# Patient Record
Sex: FEMALE | Race: WHITE | NOT HISPANIC OR LATINO | Employment: FULL TIME | URBAN - METROPOLITAN AREA
[De-identification: names, ages, dates, MRNs, and addresses within clinical notes are randomized per-mention and may not be internally consistent; named-entity substitution may affect disease eponyms.]

---

## 2023-04-20 ENCOUNTER — OFFICE VISIT (OUTPATIENT)
Dept: PRIMARY CARE | Facility: CLINIC | Age: 43
End: 2023-04-20
Payer: COMMERCIAL

## 2023-04-20 VITALS
BODY MASS INDEX: 19.45 KG/M2 | OXYGEN SATURATION: 98 % | HEIGHT: 61 IN | WEIGHT: 103 LBS | DIASTOLIC BLOOD PRESSURE: 98 MMHG | SYSTOLIC BLOOD PRESSURE: 150 MMHG | TEMPERATURE: 98.4 F | HEART RATE: 91 BPM

## 2023-04-20 DIAGNOSIS — L73.9 FOLLICULITIS: ICD-10-CM

## 2023-04-20 DIAGNOSIS — R31.21 ASYMPTOMATIC MICROSCOPIC HEMATURIA: Primary | ICD-10-CM

## 2023-04-20 PROBLEM — R35.0 URINARY FREQUENCY: Status: ACTIVE | Noted: 2023-04-20

## 2023-04-20 PROBLEM — N76.0 ACUTE VAGINITIS: Status: ACTIVE | Noted: 2023-04-20

## 2023-04-20 PROBLEM — O14.90 PRE-ECLAMPSIA (HHS-HCC): Status: ACTIVE | Noted: 2023-04-20

## 2023-04-20 PROBLEM — G90.A POTS (POSTURAL ORTHOSTATIC TACHYCARDIA SYNDROME): Status: ACTIVE | Noted: 2023-04-20

## 2023-04-20 PROBLEM — B37.31 VAGINAL YEAST INFECTION: Status: ACTIVE | Noted: 2023-04-20

## 2023-04-20 PROBLEM — R30.0 DYSURIA: Status: ACTIVE | Noted: 2023-04-20

## 2023-04-20 PROBLEM — N20.0 CALCULUS OF KIDNEY: Status: ACTIVE | Noted: 2023-04-20

## 2023-04-20 PROBLEM — N39.0 ACUTE LOWER UTI: Status: ACTIVE | Noted: 2023-04-20

## 2023-04-20 PROBLEM — F41.9 ANXIETY: Status: ACTIVE | Noted: 2023-04-20

## 2023-04-20 PROBLEM — E03.9 HYPOTHYROIDISM: Status: ACTIVE | Noted: 2023-04-20

## 2023-04-20 PROBLEM — E55.9 VITAMIN D DEFICIENCY: Status: ACTIVE | Noted: 2023-04-20

## 2023-04-20 PROBLEM — R00.2 PALPITATIONS: Status: ACTIVE | Noted: 2023-04-20

## 2023-04-20 PROBLEM — G51.0 BELL'S PALSY: Status: ACTIVE | Noted: 2023-04-20

## 2023-04-20 LAB
POC APPEARANCE, URINE: CLEAR
POC BILIRUBIN, URINE: NEGATIVE
POC BLOOD, URINE: ABNORMAL
POC COLOR, URINE: ABNORMAL
POC GLUCOSE, URINE: NEGATIVE MG/DL
POC KETONES, URINE: NEGATIVE MG/DL
POC LEUKOCYTES, URINE: NEGATIVE
POC NITRITE,URINE: NEGATIVE
POC PH, URINE: 7 PH
POC PROTEIN, URINE: NEGATIVE MG/DL
POC SPECIFIC GRAVITY, URINE: 1.01
POC UROBILINOGEN, URINE: 0.2 EU/DL

## 2023-04-20 PROCEDURE — 3077F SYST BP >= 140 MM HG: CPT | Performed by: FAMILY MEDICINE

## 2023-04-20 PROCEDURE — 99213 OFFICE O/P EST LOW 20 MIN: CPT | Performed by: FAMILY MEDICINE

## 2023-04-20 PROCEDURE — 1036F TOBACCO NON-USER: CPT | Performed by: FAMILY MEDICINE

## 2023-04-20 PROCEDURE — 3080F DIAST BP >= 90 MM HG: CPT | Performed by: FAMILY MEDICINE

## 2023-04-20 PROCEDURE — 81003 URINALYSIS AUTO W/O SCOPE: CPT | Performed by: FAMILY MEDICINE

## 2023-04-20 RX ORDER — LEVOTHYROXINE SODIUM 50 UG/1
50 TABLET ORAL DAILY
COMMUNITY
End: 2023-09-11

## 2023-04-20 RX ORDER — METOPROLOL SUCCINATE 25 MG/1
TABLET, EXTENDED RELEASE ORAL
COMMUNITY
Start: 2023-04-08 | End: 2023-07-05

## 2023-04-20 RX ORDER — HYDROQUINONE 40 MG/G
CREAM TOPICAL
COMMUNITY
Start: 2022-12-20 | End: 2023-09-11

## 2023-04-20 RX ORDER — CLOBETASOL PROPIONATE 0.46 MG/ML
SOLUTION TOPICAL
COMMUNITY
Start: 2019-03-08 | End: 2023-09-11

## 2023-04-20 RX ORDER — CHOLECALCIFEROL (VITAMIN D3) 125 MCG
CAPSULE ORAL
COMMUNITY

## 2023-04-20 RX ORDER — FLUTICASONE PROPIONATE 50 MCG
SPRAY, SUSPENSION (ML) NASAL
COMMUNITY

## 2023-04-20 ASSESSMENT — ENCOUNTER SYMPTOMS
HEMATURIA: 0
FREQUENCY: 0

## 2023-04-20 NOTE — PATIENT INSTRUCTIONS
We will send urine for urinalysis  Trace microscopic blood does not look concerning  We will resend for culture    Folliculitis: Use topical antibiotics, reduce shaving  Follow-up with signs of cysts or worsening infection

## 2023-04-20 NOTE — PROGRESS NOTES
"Subjective   Patient ID: Linnea Alcaraz is a 42 y.o. female who presents for UTI and Urgent Care follow up. States she was treated with Macrobid; completed this on 4/14/23; still experiencing a stinging sensation after urinating. States there was quite a bit of blood in her urine; has a h/o kidney stones. Lastly, pt had a copper IUD placed in February; not sure if this has changed her body chemistry and made her more susceptible to infections or if she does in fact have a kidney stone.      Recent UTI  was having blood and pain ,  resolved   Last day of Abx was 4/14   Does have a history of kidney stones, was concerned about the amount of blood that she had but it is gone now  Did have an IUD in Feb   Period last week , ended yesterday   No back pain     Was having some folliculitis after shaving in the suprapubic area was using some Neosporin on this         Review of Systems   Genitourinary:  Negative for frequency, hematuria and vaginal discharge.       Objective   BP (!) 150/98   Pulse 91   Temp 36.9 °C (98.4 °F)   Ht 1.549 m (5' 1\")   Wt 46.7 kg (103 lb)   SpO2 98%   BMI 19.46 kg/m²     Physical Exam    Patient is alert and oriented, nontoxic  No CVAT  Abdomen soft nontender nondistended  Few erythematous irritated follicles in the suprapubic region, no abscesses    Assessment/Plan   Problem List Items Addressed This Visit       Asymptomatic microscopic hematuria - Primary    Relevant Orders    POCT UA Automated manually resulted    Urinalysis Microscopic Only    Urine Culture     Other Visit Diagnoses       Folliculitis                   "

## 2023-04-22 ENCOUNTER — TELEPHONE (OUTPATIENT)
Dept: PRIMARY CARE | Facility: CLINIC | Age: 43
End: 2023-04-22

## 2023-04-22 DIAGNOSIS — R31.21 ASYMPTOMATIC MICROSCOPIC HEMATURIA: Primary | ICD-10-CM

## 2023-04-22 NOTE — TELEPHONE ENCOUNTER
PT calling she states her urine was sent for culture Thurs and she hasn't received any results yet. She is leaving for [place] on Monday for work and needs to start the antibiotic if she needs one before she goes. Can you find out where the results may be?

## 2023-04-23 RX ORDER — CIPROFLOXACIN 500 MG/1
500 TABLET ORAL 2 TIMES DAILY
Qty: 10 TABLET | Refills: 0 | Status: SHIPPED | OUTPATIENT
Start: 2023-04-23 | End: 2023-04-28

## 2023-04-23 NOTE — TELEPHONE ENCOUNTER
Spoke with patient via Answering Service. Reports persistent symptoms. No new symptoms. Reports starting treatment for yeast Infection per gynecology on Friday. Urine culture results not yet available. Requests an anabiotic to have on hand during her school trip to [facility], pending urine culture and susceptibility report. Sent in Cipro 500 mg b.i.d., in case urine culture comes back positive and susceptible to Cipro.

## 2023-04-25 NOTE — TELEPHONE ENCOUNTER
----- Message from Malinda Nielson MD sent at 4/25/2023  1:00 PM EDT -----  Can you call the patient, let her know that the urine was not sent for culture, she can take the antibiotic as directed, if she continues to have symptoms we will need to check another sample  ----- Message -----  From: Gerri Dietz LPN  Sent: 4/25/2023  11:29 AM EDT  To: Malinda Nielson MD    I called the lab they do not have any culture running and no urine in lab  ----- Message -----  From: Malinda Nielson MD  Sent: 4/24/2023   3:05 PM EDT  To: Gerri Dietz LPN    Can you check with the lab, I am not sure if this was actually sent for urine culture, the orders in there but it does not look like it is active  ----- Message -----  From: Mayito Mercado DO  Sent: 4/24/2023   2:35 PM EDT  To: Malinda Nielson MD    Spoke with patient yesterday.  Sent in Cipro for her to have on hand during her trip to [place].  Urine culture results still did not appear to be available.

## 2023-07-04 DIAGNOSIS — R00.2 PALPITATIONS: Primary | ICD-10-CM

## 2023-07-05 RX ORDER — METOPROLOL SUCCINATE 25 MG/1
TABLET, EXTENDED RELEASE ORAL
Qty: 90 TABLET | Refills: 1 | Status: SHIPPED | OUTPATIENT
Start: 2023-07-05 | End: 2024-01-22

## 2023-07-05 RX ORDER — NORETHINDRONE ACETATE AND ETHINYL ESTRADIOL 1; 20 MG/1; UG/1
1 TABLET ORAL DAILY
COMMUNITY
Start: 2023-04-12 | End: 2023-09-11

## 2023-09-09 DIAGNOSIS — E03.9 HYPOTHYROIDISM, UNSPECIFIED: ICD-10-CM

## 2023-09-11 ENCOUNTER — OFFICE VISIT (OUTPATIENT)
Dept: PRIMARY CARE | Facility: CLINIC | Age: 43
End: 2023-09-11
Payer: COMMERCIAL

## 2023-09-11 ENCOUNTER — LAB (OUTPATIENT)
Dept: LAB | Facility: LAB | Age: 43
End: 2023-09-11
Payer: COMMERCIAL

## 2023-09-11 VITALS
HEART RATE: 106 BPM | TEMPERATURE: 98.7 F | OXYGEN SATURATION: 98 % | HEIGHT: 60 IN | WEIGHT: 102 LBS | BODY MASS INDEX: 20.03 KG/M2

## 2023-09-11 DIAGNOSIS — Z20.2 STD EXPOSURE: Primary | ICD-10-CM

## 2023-09-11 DIAGNOSIS — R82.90 ABNORMAL URINE: ICD-10-CM

## 2023-09-11 DIAGNOSIS — Z20.2 STD EXPOSURE: ICD-10-CM

## 2023-09-11 LAB
POC APPEARANCE, URINE: CLEAR
POC BILIRUBIN, URINE: NEGATIVE
POC BLOOD, URINE: ABNORMAL
POC COLOR, URINE: YELLOW
POC GLUCOSE, URINE: NEGATIVE MG/DL
POC KETONES, URINE: NEGATIVE MG/DL
POC LEUKOCYTES, URINE: ABNORMAL
POC NITRITE,URINE: NEGATIVE
POC PH, URINE: 5 PH
POC PROTEIN, URINE: NEGATIVE MG/DL
POC SPECIFIC GRAVITY, URINE: 1.01
POC UROBILINOGEN, URINE: 0.2 EU/DL

## 2023-09-11 PROCEDURE — 36415 COLL VENOUS BLD VENIPUNCTURE: CPT

## 2023-09-11 PROCEDURE — 99213 OFFICE O/P EST LOW 20 MIN: CPT | Performed by: FAMILY MEDICINE

## 2023-09-11 PROCEDURE — 80074 ACUTE HEPATITIS PANEL: CPT

## 2023-09-11 PROCEDURE — 86780 TREPONEMA PALLIDUM: CPT

## 2023-09-11 PROCEDURE — 1036F TOBACCO NON-USER: CPT | Performed by: FAMILY MEDICINE

## 2023-09-11 PROCEDURE — 87591 N.GONORRHOEAE DNA AMP PROB: CPT

## 2023-09-11 PROCEDURE — 87661 TRICHOMONAS VAGINALIS AMPLIF: CPT

## 2023-09-11 PROCEDURE — 81003 URINALYSIS AUTO W/O SCOPE: CPT | Performed by: FAMILY MEDICINE

## 2023-09-11 PROCEDURE — 87389 HIV-1 AG W/HIV-1&-2 AB AG IA: CPT

## 2023-09-11 PROCEDURE — 87491 CHLMYD TRACH DNA AMP PROBE: CPT

## 2023-09-11 RX ORDER — LORATADINE 10 MG
10 TABLET,DISINTEGRATING ORAL DAILY
COMMUNITY

## 2023-09-11 RX ORDER — LEVOTHYROXINE SODIUM 50 UG/1
50 TABLET ORAL DAILY
Qty: 90 TABLET | Refills: 3 | Status: SHIPPED | OUTPATIENT
Start: 2023-09-11

## 2023-09-11 NOTE — PATIENT INSTRUCTIONS
Get your blood work as ordered.  You should hear from our office with results whether they are normal are not within a few days.  Please call the office if you do not hear from us.     Cultures for vaginal infection, GC chlamydia and trichomonas

## 2023-09-11 NOTE — PROGRESS NOTES
Subjective   Patient ID: Linnea Alcaraz is a 43 y.o. female who presents for concerns with std exposure.    Patient got   Has a recent sexual partner, is concerned about infidelity and possible STD exposure  Patient is asymptomatic  No recent vaginal discharge or itching   No sores or skin lesions     Had a recent vaginal infection but cleared  Just finished.  No urinary symptoms         Review of Systems    Objective   There were no vitals taken for this visit.    Physical Exam    Assessment/Plan   Problem List Items Addressed This Visit    None  Visit Diagnoses       STD exposure    -  Primary    Relevant Orders    C. Trachomatis / N. Gonorrhoeae, Amplified Detection    Hepatitis Panel, Acute    HIV 1/2 Antigen/Antibody Screen with Reflex to Confirmation    Syphilis Screen with Reflex    Trichomonas vaginalis, Amplified    Abnormal urine        Relevant Orders    C. Trachomatis / N. Gonorrhoeae, Amplified Detection    Hepatitis Panel, Acute    HIV 1/2 Antigen/Antibody Screen with Reflex to Confirmation    Syphilis Screen with Reflex    Trichomonas vaginalis, Amplified    POCT UA Automated manually resulted (Completed)

## 2023-09-12 LAB
CHLAMYDIA TRACH., AMPLIFIED: NEGATIVE
HEPATITIS A VIRUS IGM AB PRESENCE IN SER/PLAS BY IMMUNOASSAY: NONREACTIVE
HEPATITIS B VIRUS CORE IGM AB PRESENCE IN SER/PLAS BY IMMUNOASSY: NONREACTIVE
HEPATITIS B VIRUS SURFACE AG PRESENCE IN SERUM: NONREACTIVE
HEPATITIS C VIRUS AB PRESENCE IN SERUM: NONREACTIVE
HIV 1/ 2 AG/AB SCREEN: NONREACTIVE
N. GONORRHEA, AMPLIFIED: NEGATIVE
SYPHILIS TOTAL AB: NONREACTIVE
TRICHOMONAS VAGINALIS: NEGATIVE

## 2023-09-14 ENCOUNTER — TELEPHONE (OUTPATIENT)
Dept: PRIMARY CARE | Facility: CLINIC | Age: 43
End: 2023-09-14

## 2023-09-14 NOTE — TELEPHONE ENCOUNTER
Result Communication    Resulted Orders   C. Trachomatis / N. Gonorrhoeae, Amplified Detection   Result Value Ref Range    Neisseria gonorrhea,Amplified NEGATIVE Negative      Comment:       The APTIMA Combo 2 assay is FDA-approved for Chlamydia    trachomatis and Neisseria gonorrhoeae testing on female    endocervical and vaginal swabs, ThinPrep liquid pap    samples, male urine samples and urethral swabs.    Performance characteristics for Chlamydia trachomatis and    Neisseria gonorrhoeae testing on specific non-FDA-approved    sample types (female urine samples) have been validated by    [facility]. This    laboratory is certified by CLIA to perform high complexity    testing. Samples from all other sites are not validated    for this method.    Chlamydia trachomatis, Amplified NEGATIVE Negative      Comment:       The APTIMA Combo 2 assay is FDA-approved for Chlamydia    trachomatis and Neisseria gonorrhoeae testing on female    endocervical and vaginal swabs, ThinPrep liquid pap    samples, male urine samples and urethral swabs.    Performance characteristics for Chlamydia trachomatis and    Neisseria gonorrhoeae testing on specific non-FDA-approved    sample types (female urine samples) have been validated by    [facility]. This    laboratory is certified by CLIA to perform high complexity    testing. Samples from all other sites are not validated    for this method.   Trichomonas vaginalis, Amplified   Result Value Ref Range    Trichomonas Vaginalis NEGATIVE Negative      Comment:       The APTIMA Trichomonas vaginalis assay is FDA-approved for    testing on female endocervical swabs, vaginal swabs, and    ThinPrep liquid pap samples. Performance characteristics    for Trichomonas vaginalis on specific non-FDA-approved    sample types (female and male urine and male urethral    swabs) have been validated by [facility]  [facility]. This laboratory is certified by    CLIA to perform high complexity testing. Samples from all    other sites are not validated for this method.  Performance characteristics for Trichomonas Vaginalis testing on urine   samples has been validated by [facility].  Testing on   this sample type is not FDA-approved, but such approval is not necessary.   This laboratory is certified by CLIA to perform high complexity testing.   POCT UA Automated manually resulted   Result Value Ref Range    POC Color, Urine Yellow Straw, Yellow, Light Yellow    POC Appearance, Urine Clear Clear    POC Specific Gravity, Urine 1.010 1.005 - 1.035    POC PH, Urine 5.0 No Reference Range Established PH    POC Protein, Urine NEGATIVE NEGATIVE, 30 (1+) mg/dl    POC Glucose, Urine NEGATIVE NEGATIVE mg/dl    POC Blood, Urine MODERATE (2+) (A) NEGATIVE    POC Ketones, Urine NEGATIVE NEGATIVE mg/dl    POC Bilirubin, Urine NEGATIVE NEGATIVE    POC Urobilinogen, Urine 0.2 0.2, 1.0 EU/DL    Poc Nitrate, Urine NEGATIVE NEGATIVE    POC Leukocytes, Urine TRACE (A) NEGATIVE       4:56 PM    Malinda Nielson MD  [address] [place] [address] Clinical Support Staff  TD testing is all negative: Negative for HIV, hepatitis syphilis, GC, chlamydia, trichomonas    LVM. CA

## 2023-09-18 ENCOUNTER — TELEPHONE (OUTPATIENT)
Dept: PRIMARY CARE | Facility: CLINIC | Age: 43
End: 2023-09-18

## 2023-09-20 ENCOUNTER — TELEPHONE (OUTPATIENT)
Dept: PRIMARY CARE | Facility: CLINIC | Age: 43
End: 2023-09-20

## 2023-09-22 DIAGNOSIS — Z20.2 STD EXPOSURE: ICD-10-CM

## 2023-09-23 ENCOUNTER — LAB (OUTPATIENT)
Dept: LAB | Facility: LAB | Age: 43
End: 2023-09-23
Payer: COMMERCIAL

## 2023-09-23 DIAGNOSIS — Z20.2 STD EXPOSURE: ICD-10-CM

## 2023-09-23 PROCEDURE — 87389 HIV-1 AG W/HIV-1&-2 AB AG IA: CPT

## 2023-09-23 PROCEDURE — 36415 COLL VENOUS BLD VENIPUNCTURE: CPT

## 2023-09-24 LAB — HIV 1/ 2 AG/AB SCREEN: NONREACTIVE

## 2023-10-23 PROBLEM — R25.8 SYNKINESIS: Status: ACTIVE | Noted: 2023-05-23

## 2023-10-23 RX ORDER — CLINDAMYCIN PHOSPHATE 10 UG/ML
LOTION TOPICAL
COMMUNITY
Start: 2023-10-03

## 2023-10-23 RX ORDER — HYDROCORTISONE 25 MG/G
CREAM TOPICAL
COMMUNITY
Start: 2023-10-03

## 2023-10-23 RX ORDER — MULTIVIT-MIN/FERROUS FUMARATE 9 MG/15 ML
LIQUID (ML) ORAL
COMMUNITY
Start: 2020-09-10

## 2023-10-24 ENCOUNTER — OFFICE VISIT (OUTPATIENT)
Dept: PRIMARY CARE | Facility: CLINIC | Age: 43
End: 2023-10-24
Payer: COMMERCIAL

## 2023-10-24 VITALS
WEIGHT: 107 LBS | HEART RATE: 101 BPM | OXYGEN SATURATION: 99 % | BODY MASS INDEX: 20.2 KG/M2 | DIASTOLIC BLOOD PRESSURE: 93 MMHG | HEIGHT: 61 IN | SYSTOLIC BLOOD PRESSURE: 144 MMHG | TEMPERATURE: 98.4 F

## 2023-10-24 DIAGNOSIS — E03.9 ACQUIRED HYPOTHYROIDISM: ICD-10-CM

## 2023-10-24 DIAGNOSIS — G90.A POTS (POSTURAL ORTHOSTATIC TACHYCARDIA SYNDROME): ICD-10-CM

## 2023-10-24 DIAGNOSIS — Z00.00 ROUTINE GENERAL MEDICAL EXAMINATION AT A HEALTH CARE FACILITY: Primary | ICD-10-CM

## 2023-10-24 DIAGNOSIS — G51.0 BELL'S PALSY: ICD-10-CM

## 2023-10-24 DIAGNOSIS — E55.9 VITAMIN D DEFICIENCY: ICD-10-CM

## 2023-10-24 LAB
POC APPEARANCE, URINE: CLEAR
POC BILIRUBIN, URINE: NEGATIVE
POC BLOOD, URINE: ABNORMAL
POC COLOR, URINE: YELLOW
POC GLUCOSE, URINE: NEGATIVE MG/DL
POC KETONES, URINE: NEGATIVE MG/DL
POC LEUKOCYTES, URINE: NEGATIVE
POC NITRITE,URINE: NEGATIVE
POC PH, URINE: 5.5 PH
POC PROTEIN, URINE: NEGATIVE MG/DL
POC SPECIFIC GRAVITY, URINE: 1.01
POC UROBILINOGEN, URINE: 0.2 EU/DL

## 2023-10-24 PROCEDURE — 99396 PREV VISIT EST AGE 40-64: CPT | Performed by: FAMILY MEDICINE

## 2023-10-24 PROCEDURE — 3080F DIAST BP >= 90 MM HG: CPT | Performed by: FAMILY MEDICINE

## 2023-10-24 PROCEDURE — 3077F SYST BP >= 140 MM HG: CPT | Performed by: FAMILY MEDICINE

## 2023-10-24 PROCEDURE — 1036F TOBACCO NON-USER: CPT | Performed by: FAMILY MEDICINE

## 2023-10-24 PROCEDURE — 81003 URINALYSIS AUTO W/O SCOPE: CPT | Performed by: FAMILY MEDICINE

## 2023-10-24 NOTE — PATIENT INSTRUCTIONS
Get your blood work as ordered.  You should hear from our office with results whether they are normal are not within a few days.  Please call the office if you do not hear from us.     You should be getting cardiovascular exercise 3-5 times per week for 30-45 minutes.  This includes exercises such as running, brisk walking, biking or swimming.     Hypothyroidism:  Hypothyroid is treated with medications that replace your normal tyroid hormone that your body is not producing enough of.  It is important that you take the thyroid medication daily, in most cases you'll need to take thyroid medication for the rest of your life.  When your doses of thyroid are stable, we usually check blood work once a year.  However, if you have problems with fatigue or excessive weight gain we can repeat those labs more often as indicated.

## 2023-10-24 NOTE — PROGRESS NOTES
Subjective   Patient ID: Linnea Alcaraz is a 43 y.o. female who presents for Annual Exam. Requesting a UA due to h/o kidney stones. Willing to have a flu vaccine.    No increase kidney stones sxs     Saw Gyn , had IUD removed     Mammogram normal recently    Saw derm  today :  folliculitis topical Rx given   Skin is clear     Hypothyroidism: Energy level has been stable, weight has been stable, patient is tolerating medication well     exercising some   No cp or sob          ROS :  ( No or Yes )  Any eye problems:    N  Frequent nasal congestion or sneezing:  N  Difficulty hearing:  N  Ear problems:   N  Asthma or wheezing:   N  Frequent cough:   N  Shortness of breath:N  Hemoptysis: N  Hx of TB: N  High blood pressure: N  Heart disease: N  Heart murmur:N  Chest pain or pressure with exertion:N  Leg pains with walking up hill: N  Fast heartbeat or palpitations:N  Varicose veins: N  Difficulty swallowing foods or liquids: N  Abdominal pains: N  Frequent indigestion or heartburn: N  Constipation: N  Diarrhea or loose stools: N  Weight changes recently: N  Change in bowel movements: N  An ulcer: N  Black stools: N  Jaundice, hepatitis or liver problems: N  Gallstones or gallbladder problems: N  Stomach or intestinal problems: N  Vomited blood : N  Blood in bowel movements: N  Sickle cell trait  or Anemia: N  Been refused as a blood donor: N  Problems with her kidney, bladder, or prostate: N  Loss of control of your urine: N  Pain or burning with urination: N  Blood in her urine: N  Trouble starting flow of urine: N  Frequent urination at night: N  History of venereal disease: N  Any skin problems: y  Diabetes: N  Thyroid disease: N  Frequent back pain: N  Pain or swelling around joints: N  Broken any bones: N  Frequent headaches: N  Dizziness: N  Have you ever had Seizures or convulsions: N  Have you ever temporarily lost control of your hand or foot : N   Had a stroke or been paralyzed : N  Temporarily lost your  "ability to speak: N  Fainted or lost consciousness: N  Hallucinations: N  Nervousness: N  Do you take medications for your nerves: N  Trouble falling asleep or staying asleep: N  Do you feel tired even after a good night sleep: N  Do you feel down in the dumps or depressed: N  Frequent crying: N  Using alcohol excessively: N  Any street drug use : N  Do have any other medical problems that are concerns :      Review of Systems    Objective   BP (!) 144/93   Pulse 101   Temp 36.9 °C (98.4 °F)   Ht 1.549 m (5' 1\")   Wt 48.5 kg (107 lb)   SpO2 99%   BMI 20.22 kg/m²     Physical Exam  Constitutional:       General: She is not in acute distress.     Appearance: Normal appearance.   HENT:      Head: Normocephalic and atraumatic.      Right Ear: Tympanic membrane and ear canal normal.      Left Ear: Tympanic membrane and ear canal normal.      Mouth/Throat:      Mouth: Mucous membranes are moist.   Eyes:      Conjunctiva/sclera: Conjunctivae normal.      Pupils: Pupils are equal, round, and reactive to light.   Neck:      Vascular: No carotid bruit.   Cardiovascular:      Rate and Rhythm: Normal rate and regular rhythm.      Heart sounds: No murmur heard.  Pulmonary:      Effort: Pulmonary effort is normal.      Breath sounds: Normal breath sounds. No wheezing or rhonchi.   Chest:   Breasts:     Right: Normal.      Left: Normal.   Abdominal:      General: Bowel sounds are normal.      Palpations: Abdomen is soft.   Musculoskeletal:         General: No swelling.      Cervical back: No rigidity.      Comments: Small nodule at the right palm consistent with calcification, ganglion   Lymphadenopathy:      Cervical: No cervical adenopathy.      Upper Body:      Right upper body: No axillary adenopathy.      Left upper body: No axillary adenopathy.   Skin:     General: Skin is warm and dry.      Findings: No rash.   Neurological:      General: No focal deficit present.      Mental Status: She is alert.      Comments: Mild " right-sided facial asymmetry, droop   Psychiatric:         Mood and Affect: Mood normal.         Assessment/Plan   Problem List Items Addressed This Visit             ICD-10-CM    Bell's palsy G51.0    Hypothyroidism E03.9    Relevant Orders    CBC    Comprehensive Metabolic Panel    Thyroid Stimulating Hormone    Lipid Panel    Thyroxine, Free    POTS (postural orthostatic tachycardia syndrome) G90.A    Relevant Orders    CBC    Comprehensive Metabolic Panel    Thyroid Stimulating Hormone    Lipid Panel    Thyroxine, Free    Vitamin D deficiency E55.9    Relevant Orders    CBC    Comprehensive Metabolic Panel    Thyroid Stimulating Hormone    Lipid Panel    Thyroxine, Free    Vitamin D 25-Hydroxy,Total (for eval of Vitamin D levels)     Other Visit Diagnoses         Codes    Routine general medical examination at a health care facility    -  Primary Z00.00    Relevant Orders    CBC    Comprehensive Metabolic Panel    Thyroid Stimulating Hormone    Lipid Panel    Thyroxine, Free    POCT UA Automated manually resulted (Completed)

## 2023-11-11 ENCOUNTER — LAB (OUTPATIENT)
Dept: LAB | Facility: LAB | Age: 43
End: 2023-11-11
Payer: COMMERCIAL

## 2023-11-11 DIAGNOSIS — Z00.00 ROUTINE GENERAL MEDICAL EXAMINATION AT A HEALTH CARE FACILITY: ICD-10-CM

## 2023-11-11 DIAGNOSIS — E03.9 ACQUIRED HYPOTHYROIDISM: ICD-10-CM

## 2023-11-11 DIAGNOSIS — E55.9 VITAMIN D DEFICIENCY: ICD-10-CM

## 2023-11-11 DIAGNOSIS — G90.A POTS (POSTURAL ORTHOSTATIC TACHYCARDIA SYNDROME): ICD-10-CM

## 2023-11-11 LAB
25(OH)D3 SERPL-MCNC: 46 NG/ML (ref 30–100)
ALBUMIN SERPL BCP-MCNC: 4.5 G/DL (ref 3.4–5)
ALP SERPL-CCNC: 47 U/L (ref 33–110)
ALT SERPL W P-5'-P-CCNC: 11 U/L (ref 7–45)
ANION GAP SERPL CALC-SCNC: 11 MMOL/L (ref 10–20)
AST SERPL W P-5'-P-CCNC: 14 U/L (ref 9–39)
BILIRUB SERPL-MCNC: 1.5 MG/DL (ref 0–1.2)
BUN SERPL-MCNC: 9 MG/DL (ref 6–23)
CALCIUM SERPL-MCNC: 9.6 MG/DL (ref 8.6–10.3)
CHLORIDE SERPL-SCNC: 102 MMOL/L (ref 98–107)
CHOLEST SERPL-MCNC: 148 MG/DL (ref 0–199)
CHOLESTEROL/HDL RATIO: 2.1
CO2 SERPL-SCNC: 27 MMOL/L (ref 21–32)
CREAT SERPL-MCNC: 0.62 MG/DL (ref 0.5–1.05)
ERYTHROCYTE [DISTWIDTH] IN BLOOD BY AUTOMATED COUNT: 12.3 % (ref 11.5–14.5)
GFR SERPL CREATININE-BSD FRML MDRD: >90 ML/MIN/1.73M*2
GLUCOSE SERPL-MCNC: 80 MG/DL (ref 74–99)
HCT VFR BLD AUTO: 42.2 % (ref 36–46)
HDLC SERPL-MCNC: 70.5 MG/DL
HGB BLD-MCNC: 13.7 G/DL (ref 12–16)
LDLC SERPL CALC-MCNC: 65 MG/DL
MCH RBC QN AUTO: 31.5 PG (ref 26–34)
MCHC RBC AUTO-ENTMCNC: 32.5 G/DL (ref 32–36)
MCV RBC AUTO: 97 FL (ref 80–100)
NON HDL CHOLESTEROL: 78 MG/DL (ref 0–149)
NRBC BLD-RTO: 0 /100 WBCS (ref 0–0)
PLATELET # BLD AUTO: 266 X10*3/UL (ref 150–450)
POTASSIUM SERPL-SCNC: 4.2 MMOL/L (ref 3.5–5.3)
PROT SERPL-MCNC: 6.8 G/DL (ref 6.4–8.2)
RBC # BLD AUTO: 4.35 X10*6/UL (ref 4–5.2)
SODIUM SERPL-SCNC: 136 MMOL/L (ref 136–145)
T4 FREE SERPL-MCNC: 1.06 NG/DL (ref 0.61–1.12)
TRIGL SERPL-MCNC: 63 MG/DL (ref 0–149)
TSH SERPL-ACNC: 2.14 MIU/L (ref 0.44–3.98)
VLDL: 13 MG/DL (ref 0–40)
WBC # BLD AUTO: 7.9 X10*3/UL (ref 4.4–11.3)

## 2023-11-11 PROCEDURE — 84443 ASSAY THYROID STIM HORMONE: CPT

## 2023-11-11 PROCEDURE — 80053 COMPREHEN METABOLIC PANEL: CPT

## 2023-11-11 PROCEDURE — 80061 LIPID PANEL: CPT

## 2023-11-11 PROCEDURE — 84439 ASSAY OF FREE THYROXINE: CPT

## 2023-11-11 PROCEDURE — 82306 VITAMIN D 25 HYDROXY: CPT

## 2023-11-11 PROCEDURE — 36415 COLL VENOUS BLD VENIPUNCTURE: CPT

## 2023-11-11 PROCEDURE — 85027 COMPLETE CBC AUTOMATED: CPT

## 2024-01-09 ENCOUNTER — TELEPHONE (OUTPATIENT)
Dept: PRIMARY CARE | Facility: CLINIC | Age: 44
End: 2024-01-09
Payer: COMMERCIAL

## 2024-01-09 NOTE — TELEPHONE ENCOUNTER
Pt called in stating she thinks she has a UTI. Advised first available appt is Thursday. Pt states she needs to be seen today. Advised Dr Nielson comes in at 11 am and is fully booked for her short day. Pt advised she can't have a Uti for 3 days and she wont go sit in an UC for hours when she has a family dr. Pt asking if she can just bring in a urine sample. Please advise

## 2024-01-15 ENCOUNTER — TELEMEDICINE (OUTPATIENT)
Dept: UROLOGY | Facility: CLINIC | Age: 44
End: 2024-01-15
Payer: COMMERCIAL

## 2024-01-15 DIAGNOSIS — N20.0 URIC ACID NEPHROLITHIASIS: ICD-10-CM

## 2024-01-15 DIAGNOSIS — R31.29 MICROSCOPIC HEMATURIA: ICD-10-CM

## 2024-01-15 DIAGNOSIS — R10.9 FLANK PAIN: ICD-10-CM

## 2024-01-15 PROCEDURE — 99214 OFFICE O/P EST MOD 30 MIN: CPT | Performed by: UROLOGY

## 2024-01-15 RX ORDER — TAMSULOSIN HYDROCHLORIDE 0.4 MG/1
0.4 CAPSULE ORAL DAILY
Qty: 30 CAPSULE | Refills: 0 | Status: SHIPPED | OUTPATIENT
Start: 2024-01-15 | End: 2024-02-14

## 2024-01-15 RX ORDER — KETOROLAC TROMETHAMINE 10 MG/1
10 TABLET, FILM COATED ORAL EVERY 6 HOURS PRN
Qty: 20 TABLET | Refills: 0 | Status: SHIPPED | OUTPATIENT
Start: 2024-01-15

## 2024-01-15 NOTE — PROGRESS NOTES
This visit was completed via telemedicine. All issues as below were discussed and addressed but no physical exam was performed unless allowed by visual confirmation. If it was felt that the patient should be evaluated in clinic, then they were directed there. Patient verbally consented to visit.    Subjective   Patient ID: Linnea Alcaraz is a 43 y.o. female with recurrent nephrolithiasis and history of microscopic hematuria who I last saw 3/2021.     HPI  She complains of significant suprapubic pressure and pain. She has history of uric acid stones. She is s/p ureteroscopy with me in 2011.     She has history of negative work up for microscopic hematuria in 2021.         Objective   Past Medical History:   Diagnosis Date    Personal history of urinary calculi     History of kidney stones     Past Surgical History:   Procedure Laterality Date    COLONOSCOPY  02/28/2017    Colonoscopy         Assessment/Plan     Recurrent Nephrolithiasis    We will order STAT CT Stone Protocol and start the patient on Flomax. I will also prescribe her Toradol for her pain and pressure.  Risks, benefits, side effects, alternatives and options were discussed with patient and the patient voiced understanding.     We will follow up virtually to discuss these imaging results.       2. Microhematuria s/p negative work up 2021    I will order a microscopic urinalysis.     All questions were answered to the patient's satisfaction. Patient agrees with the plan and wishes to proceed. Follow-up will be scheduled appropriately.       Scribed for Dr. Rivera by Katelyn Casalla. I , Dr Rivera, have personally reviewed and agreed with the information entered by the Virtual Scribe.

## 2024-01-18 ENCOUNTER — ANCILLARY PROCEDURE (OUTPATIENT)
Dept: RADIOLOGY | Facility: CLINIC | Age: 44
End: 2024-01-18
Payer: COMMERCIAL

## 2024-01-18 ENCOUNTER — LAB (OUTPATIENT)
Dept: LAB | Facility: LAB | Age: 44
End: 2024-01-18
Payer: COMMERCIAL

## 2024-01-18 DIAGNOSIS — R31.29 MICROSCOPIC HEMATURIA: ICD-10-CM

## 2024-01-18 DIAGNOSIS — N20.0 URIC ACID NEPHROLITHIASIS: ICD-10-CM

## 2024-01-18 DIAGNOSIS — R10.9 FLANK PAIN: ICD-10-CM

## 2024-01-18 PROCEDURE — 74176 CT ABD & PELVIS W/O CONTRAST: CPT | Mod: FR

## 2024-01-18 PROCEDURE — 81001 URINALYSIS AUTO W/SCOPE: CPT

## 2024-01-18 PROCEDURE — 74176 CT ABD & PELVIS W/O CONTRAST: CPT | Mod: FOREIGN READ | Performed by: RADIOLOGY

## 2024-01-19 LAB
RBC #/AREA URNS AUTO: NORMAL /HPF
WBC #/AREA URNS AUTO: NORMAL /HPF

## 2024-01-22 DIAGNOSIS — R00.2 PALPITATIONS: ICD-10-CM

## 2024-01-22 PROBLEM — R17 INCREASED BILIRUBIN LEVEL: Status: ACTIVE | Noted: 2024-01-22

## 2024-01-22 PROBLEM — R17 UNSPECIFIED JAUNDICE: Status: ACTIVE | Noted: 2022-12-26

## 2024-01-22 PROBLEM — R10.9 FLANK PAIN: Status: ACTIVE | Noted: 2024-01-22

## 2024-01-22 PROBLEM — M25.519 SHOULDER PAIN: Status: ACTIVE | Noted: 2024-01-22

## 2024-01-22 RX ORDER — NORETHINDRONE ACETATE AND ETHINYL ESTRADIOL .02; 1 MG/1; MG/1
TABLET ORAL
COMMUNITY
Start: 2023-01-09

## 2024-01-22 RX ORDER — METOPROLOL SUCCINATE 25 MG/1
TABLET, EXTENDED RELEASE ORAL
Qty: 90 TABLET | Refills: 1 | Status: SHIPPED | OUTPATIENT
Start: 2024-01-22

## 2024-01-22 RX ORDER — IBUPROFEN 100 MG/5ML
SUSPENSION, ORAL (FINAL DOSE FORM) ORAL
COMMUNITY

## 2024-01-24 ENCOUNTER — TELEMEDICINE (OUTPATIENT)
Dept: UROLOGY | Facility: CLINIC | Age: 44
End: 2024-01-24
Payer: COMMERCIAL

## 2024-01-24 DIAGNOSIS — N20.0 CALCULUS OF KIDNEY: Primary | ICD-10-CM

## 2024-01-24 PROCEDURE — 99214 OFFICE O/P EST MOD 30 MIN: CPT | Performed by: UROLOGY

## 2024-01-24 NOTE — PROGRESS NOTES
Subjective   This visit was completed via telemedicine. All issues as below were discussed and addressed but no physical exam was performed unless allowed by visual confirmation. If it was felt that the patient should be evaluated in clinic, then they were directed there. Patient verbally consented to visit.    Linnea Alcaraz is a 43 y.o. female  with recurrent nephrolithiasis and history of microscopic hematuria s/p negative workup in 2021. During her last visit she had complaints of suprapubic pressure and pain. She has a history of ? uric acid stones s/p ureteroscopy with me in 2021. She was started on Tamsulosin for MET. She presents today for a follow up visit to review CT A&P. Patients pain and pressure have resolved.     I reviewed CT A&P from 1/18/2024 which shows a 3mm non-obstructing stone in the left kidney and a 2.5 calcification at the left UVJ.         Past Medical History:   Diagnosis Date    Personal history of urinary calculi     History of kidney stones     Past Surgical History:   Procedure Laterality Date    COLONOSCOPY  02/28/2017    Colonoscopy     Family History   Problem Relation Name Age of Onset    Other (high blood pressure) Father       Current Outpatient Medications   Medication Sig Dispense Refill    ascorbic acid (Vitamin C) 1,000 mg tablet [facility] Vitamin C 1000 MG Oral Tablet Refills: 0 DO Active      cholecalciferol (Vitamin D-3) 125 MCG (5000 UT) capsule Take by mouth.      clindamycin (Cleocin T) 1 % lotion to affected area      copper (Paragard) 380 square mm IUD 1 each by intrauterine route 1 time.      dextran 70-hypromellose (Artificial Tears,[id number],) 0.1-0.3 % ophthalmic solution Use 1 Drop in the left eye as needed.      fluticasone (Flonase) 50 mcg/actuation nasal spray Administer into affected nostril(s).      hydrocortisone 2.5 % cream       ketorolac (Toradol) 10 mg tablet Take 1 tablet (10 mg) by mouth every 6 hours if needed for moderate pain (4 - 6). 20 tablet 0     levothyroxine (Synthroid, Levoxyl) 50 mcg tablet TAKE 1 TABLET BY MOUTH EVERY DAY AS DIRECTED 90 tablet 3    loratadine (Claritin Reditabs) 10 mg disintegrating tablet Take 1 tablet (10 mg) by mouth once daily.      metoprolol succinate XL (Toprol-XL) 25 mg 24 hr tablet HALF TABLET BY MOUTH TWO TIMES DAILY 90 tablet 1    norethindrone ac-eth estradioL (Junel 1/20, 21,) 1-20 mg-mcg tablet Junel 1/20 1-20 MG-MCG Oral Tablet TAKE 1 TABLET BY MOUTH EVERY DAY Quantity: 3 Refills: 3 Ordered: 9-Jan-2023 Malinda Nielson MD Start : 9-Jan-2023 Active      tamsulosin (Flomax) 0.4 mg 24 hr capsule Take 1 capsule (0.4 mg) by mouth once daily. 30 capsule 0     No current facility-administered medications for this visit.     Allergies   Allergen Reactions    Clarithromycin Other    Macrolide Antibiotics GI Upset     Nausea & Vomiting with Biaxin.     Social History     Socioeconomic History    Marital status:      Spouse name: Not on file    Number of children: Not on file    Years of education: Not on file    Highest education level: Not on file   Occupational History    Not on file   Tobacco Use    Smoking status: Never    Smokeless tobacco: Never   Substance and Sexual Activity    Alcohol use: Not Currently    Drug use: Never    Sexual activity: Not on file   Other Topics Concern    Not on file   Social History Narrative    Not on file     Social Determinants of Health     Financial Resource Strain: Not on file   Food Insecurity: Not on file   Transportation Needs: Not on file   Physical Activity: Not on file   Stress: Not on file   Social Connections: Not on file   Intimate Partner Violence: Not on file   Housing Stability: Not on file       Review of Systems  Pertinent items are noted in HPI.    Objective     Lab Review  Lab Results   Component Value Date    WBC 7.9 11/11/2023    RBC 4.35 11/11/2023    HGB 13.7 11/11/2023    HCT 42.2 11/11/2023     11/11/2023      Lab Results   Component Value Date    BUN 9  11/11/2023    CREATININE 0.62 11/11/2023          Assessment/Plan   Diagnoses and all orders for this visit:  Calculus of kidney  -     US renal complete; Future  Nephrolithiasis     I reviewed CT A&P from 1/18/2024 which shows a 3mm non-obstructing stone in the left kidney and a 2.5 calcification at the left UVJ.     Patients pain and pressure have resolved.       We will obtain a litholink study.    Follow up virtually in 6 weeks to review.     We discussed that most calculi under 5 mm can be safely observed. This recommendation is based on large series looking at spontaneous passage rates that suggest that the likelihood of stone passage is highest for stones under 4 mm in size (approximately 70-80%), moderate for stones between 4 and 6 mm (50%), and lowest for stones 6 mm or greater (20%-30%).      However, stones under 5 mm that are in a solitary kidney, associated with infection or causing significant obstruction should be removed to prevent loss of renal function and/or sepsis. Also, every patient has different anatomy and different ability to pass calculi and tolerate pain, so intervention is also recommended in patients with small stones that are in significant discomfort or that have a history of being unable to pass small calculi. Patient understands and desires to proceed.    Annual renal US will be ordered    All questions were answered to the patient's satisfaction. Patient agrees with the plan and wishes to proceed. Follow-up will be scheduled appropriately.     I spent 30 minutes of dedicated E&M time, including preparation and review of records, notes, and data, time spent with patient/family, and documentation.       Scribed for Dr. Rivera by Luz Maria Merchant. I , Dr Rivera, have personally reviewed and agreed with the information entered by the Virtual Scribe.

## 2024-03-06 ENCOUNTER — APPOINTMENT (OUTPATIENT)
Dept: UROLOGY | Facility: CLINIC | Age: 44
End: 2024-03-06
Payer: COMMERCIAL

## 2024-03-13 ENCOUNTER — APPOINTMENT (OUTPATIENT)
Dept: PRIMARY CARE | Facility: CLINIC | Age: 44
End: 2024-03-13
Payer: COMMERCIAL

## 2024-07-29 ENCOUNTER — APPOINTMENT (OUTPATIENT)
Dept: PRIMARY CARE | Facility: CLINIC | Age: 44
End: 2024-07-29
Payer: COMMERCIAL

## 2024-09-14 DIAGNOSIS — E03.9 HYPOTHYROIDISM, UNSPECIFIED: ICD-10-CM

## 2024-09-16 RX ORDER — LEVOTHYROXINE SODIUM 50 UG/1
50 TABLET ORAL DAILY
Qty: 30 TABLET | Refills: 1 | Status: SHIPPED | OUTPATIENT
Start: 2024-09-16

## 2024-10-14 DIAGNOSIS — E03.9 HYPOTHYROIDISM, UNSPECIFIED: ICD-10-CM

## 2024-10-14 RX ORDER — LEVOTHYROXINE SODIUM 50 UG/1
50 TABLET ORAL DAILY
Qty: 90 TABLET | Refills: 0 | Status: SHIPPED | OUTPATIENT
Start: 2024-10-14

## 2024-10-18 DIAGNOSIS — R00.2 PALPITATIONS: ICD-10-CM

## 2024-10-18 RX ORDER — METOPROLOL SUCCINATE 25 MG/1
TABLET, EXTENDED RELEASE ORAL
Qty: 30 TABLET | Refills: 0 | Status: SHIPPED | OUTPATIENT
Start: 2024-10-18

## 2024-10-22 ENCOUNTER — APPOINTMENT (OUTPATIENT)
Dept: PRIMARY CARE | Facility: CLINIC | Age: 44
End: 2024-10-22
Payer: COMMERCIAL

## 2024-11-06 PROBLEM — R30.0 DYSURIA: Status: RESOLVED | Noted: 2023-04-20 | Resolved: 2024-11-06

## 2024-11-06 PROBLEM — R35.0 URINARY FREQUENCY: Status: RESOLVED | Noted: 2023-04-20 | Resolved: 2024-11-06

## 2024-11-06 PROBLEM — N39.0 ACUTE LOWER UTI: Status: RESOLVED | Noted: 2023-04-20 | Resolved: 2024-11-06

## 2024-11-06 PROBLEM — B37.31 VAGINAL YEAST INFECTION: Status: RESOLVED | Noted: 2023-04-20 | Resolved: 2024-11-06

## 2024-11-06 PROBLEM — R10.9 FLANK PAIN: Status: RESOLVED | Noted: 2024-01-22 | Resolved: 2024-11-06

## 2024-11-06 PROBLEM — O14.90 PRE-ECLAMPSIA: Status: RESOLVED | Noted: 2023-04-20 | Resolved: 2024-11-06

## 2024-11-06 PROBLEM — N76.0 ACUTE VAGINITIS: Status: RESOLVED | Noted: 2023-04-20 | Resolved: 2024-11-06

## 2024-11-12 ENCOUNTER — APPOINTMENT (OUTPATIENT)
Dept: PRIMARY CARE | Facility: CLINIC | Age: 44
End: 2024-11-12
Payer: COMMERCIAL

## 2024-11-12 VITALS
TEMPERATURE: 98 F | WEIGHT: 113 LBS | OXYGEN SATURATION: 98 % | HEART RATE: 84 BPM | DIASTOLIC BLOOD PRESSURE: 82 MMHG | SYSTOLIC BLOOD PRESSURE: 134 MMHG | HEIGHT: 61 IN | BODY MASS INDEX: 21.34 KG/M2

## 2024-11-12 DIAGNOSIS — E03.9 ACQUIRED HYPOTHYROIDISM: ICD-10-CM

## 2024-11-12 DIAGNOSIS — Z13.220 LIPID SCREENING: ICD-10-CM

## 2024-11-12 DIAGNOSIS — M25.512 CHRONIC LEFT SHOULDER PAIN: ICD-10-CM

## 2024-11-12 DIAGNOSIS — G90.A POTS (POSTURAL ORTHOSTATIC TACHYCARDIA SYNDROME): ICD-10-CM

## 2024-11-12 DIAGNOSIS — I73.00 RAYNAUD'S PHENOMENON WITHOUT GANGRENE: ICD-10-CM

## 2024-11-12 DIAGNOSIS — G89.29 CHRONIC LEFT SHOULDER PAIN: ICD-10-CM

## 2024-11-12 DIAGNOSIS — R53.83 FATIGUE, UNSPECIFIED TYPE: ICD-10-CM

## 2024-11-12 DIAGNOSIS — G51.0 BELL'S PALSY: ICD-10-CM

## 2024-11-12 DIAGNOSIS — Z00.00 ROUTINE GENERAL MEDICAL EXAMINATION AT A HEALTH CARE FACILITY: Primary | ICD-10-CM

## 2024-11-12 PROBLEM — R17 INCREASED BILIRUBIN LEVEL: Status: RESOLVED | Noted: 2024-01-22 | Resolved: 2024-11-12

## 2024-11-12 PROBLEM — R00.2 PALPITATIONS: Status: RESOLVED | Noted: 2023-04-20 | Resolved: 2024-11-12

## 2024-11-12 PROBLEM — R17 UNSPECIFIED JAUNDICE: Status: RESOLVED | Noted: 2022-12-26 | Resolved: 2024-11-12

## 2024-11-12 PROBLEM — M25.519 SHOULDER PAIN: Status: RESOLVED | Noted: 2024-01-22 | Resolved: 2024-11-12

## 2024-11-12 PROCEDURE — 99213 OFFICE O/P EST LOW 20 MIN: CPT | Performed by: FAMILY MEDICINE

## 2024-11-12 PROCEDURE — 99396 PREV VISIT EST AGE 40-64: CPT | Performed by: FAMILY MEDICINE

## 2024-11-12 PROCEDURE — 1036F TOBACCO NON-USER: CPT | Performed by: FAMILY MEDICINE

## 2024-11-12 PROCEDURE — 3008F BODY MASS INDEX DOCD: CPT | Performed by: FAMILY MEDICINE

## 2024-11-12 ASSESSMENT — PATIENT HEALTH QUESTIONNAIRE - PHQ9
1. LITTLE INTEREST OR PLEASURE IN DOING THINGS: NOT AT ALL
2. FEELING DOWN, DEPRESSED OR HOPELESS: NOT AT ALL
SUM OF ALL RESPONSES TO PHQ9 QUESTIONS 1 AND 2: 0

## 2024-11-12 NOTE — PATIENT INSTRUCTIONS
Get your blood work as ordered.  You should hear from our office with results whether they are normal are not within a few days.  Please call the office if you do not hear from us.     Wean down to metoprolol half tablet once per day     Can wean down after 1-2 weeks     See what bps are after reducing the medications    Please check your blood pressures and pulses over the next 2 weeks several times per week.  Please call the office and report results.      You should be getting cardiovascular exercise 3-5 times per week for 30-45 minutes.  This includes exercises such as running, brisk walking, biking or swimming.     Raynaud's phenomenon:  Keep hands warm     X-ray of the shoulder, consider physical therapy if persisting symptoms  Handout on exercises given

## 2024-11-12 NOTE — PROGRESS NOTES
Subjective   Patient ID: Linnea Alcaraz is a 44 y.o. female who presents for Annual Exam. C/O feeling tired; had a tonsillectomy done due to recurrent tonsil stones. Pt wonders if the Metoprolol is playing a role in her fatigue. Last concern is possible carpal tunnel sx's in her hands.         BPS lower   No recent POTS sxs   Standing more   Occ sxs prior to periods       Some fatigue     Numbness in hands over winter   With cold   In AM   In classroom       Patient has had some left shoulder pain over the last year and a half since she got her COVID-vaccine  Previously steroids did not help  Sometimes it hurts to lay on that side     ROS :  ( No or Yes )  Any eye problems:    N  Frequent nasal congestion or sneezing:  N  Difficulty hearing:  N  Ear problems:   N  Asthma or wheezing:   N  Frequent cough:   N  Shortness of breath:N  Hemoptysis: N  Hx of TB: N  High blood pressure: N  Heart disease: N  Heart murmur:N  Chest pain or pressure with exertion:N  Leg pains with walking up hill: N  Fast heartbeat or palpitations:N  Varicose veins: N  Difficulty swallowing foods or liquids: N  Abdominal pains: N  Frequent indigestion or heartburn: N  Constipation: N  Diarrhea or loose stools: N  Weight changes recently: N  Change in bowel movements: N  An ulcer: N  Black stools: N  Jaundice, hepatitis or liver problems: N  Gallstones or gallbladder problems: N  Stomach or intestinal problems: N  Vomited blood : N  Blood in bowel movements: N  Sickle cell trait  or Anemia: N  Been refused as a blood donor: N  Problems with her kidney, bladder, or prostate: N  Loss of control of your urine: N  Pain or burning with urination: N  Blood in her urine: N  Trouble starting flow of urine: N  Frequent urination at night: N  History of venereal disease: N  Any skin problems: N  Diabetes: N  Thyroid disease: y  Frequent back pain: N  Pain or swelling around joints: N  Broken any bones: N  Frequent headaches: N  Dizziness: N  Have you  "ever had Seizures or convulsions: N  Have you ever temporarily lost control of your hand or foot : N   Had a stroke or been paralyzed : N  Temporarily lost your ability to speak: y  Fainted or lost consciousness: N  Hallucinations: N  Nervousness: N  Do you take medications for your nerves: N  Trouble falling asleep or staying asleep: N  Do you feel tired even after a good night sleep: N  Do you feel down in the dumps or depressed: N  Frequent crying: N  Using alcohol excessively: N  Any street drug use : N  Do have any other medical problems that are concerns :     Review of Systems    Objective   /82   Pulse 84   Temp 36.7 °C (98 °F)   Ht 1.549 m (5' 1\")   Wt 51.3 kg (113 lb)   LMP 10/17/2024   SpO2 98%   BMI 21.35 kg/m²     Physical Exam  Constitutional:       General: She is not in acute distress.     Appearance: Normal appearance.   HENT:      Head: Normocephalic.      Comments: Facial asymmetry      Right Ear: Tympanic membrane, ear canal and external ear normal.      Left Ear: Tympanic membrane, ear canal and external ear normal.      Nose: Nose normal.      Mouth/Throat:      Mouth: Mucous membranes are moist.      Pharynx: No oropharyngeal exudate or posterior oropharyngeal erythema.   Eyes:      Extraocular Movements: Extraocular movements intact.      Conjunctiva/sclera: Conjunctivae normal.      Pupils: Pupils are equal, round, and reactive to light.   Cardiovascular:      Rate and Rhythm: Normal rate and regular rhythm.      Heart sounds: No murmur heard.  Pulmonary:      Effort: Pulmonary effort is normal.      Breath sounds: Normal breath sounds.   Abdominal:      General: Bowel sounds are normal.      Palpations: Abdomen is soft.   Musculoskeletal:         General: Normal range of motion.      Cervical back: No rigidity.      Comments: Radial pulses 2+ and equal  Negative Tinel's sign  Capillary refill on the fingertips is  about 5 seconds    A little tenderness along the left " deltoid  Range of motion intact   Lymphadenopathy:      Cervical: No cervical adenopathy.   Skin:     General: Skin is warm and dry.      Findings: No rash.   Neurological:      General: No focal deficit present.      Mental Status: She is alert and oriented to person, place, and time.      Cranial Nerves: No cranial nerve deficit.      Gait: Gait normal.   Psychiatric:         Mood and Affect: Mood normal.         Behavior: Behavior normal.         Assessment/Plan   Problem List Items Addressed This Visit             ICD-10-CM    Bell's palsy G51.0    Hypothyroidism E03.9    Relevant Orders    Comprehensive Metabolic Panel    CBC and Auto Differential    Thyroid Stimulating Hormone    Thyroxine, Free    Lipid Panel    POTS (postural orthostatic tachycardia syndrome) G90.A    Relevant Orders    Comprehensive Metabolic Panel    CBC and Auto Differential    Thyroid Stimulating Hormone    Thyroxine, Free    Lipid Panel     Other Visit Diagnoses         Codes    Routine general medical examination at a health care facility    -  Primary Z00.00    Relevant Orders    Comprehensive Metabolic Panel    CBC and Auto Differential    Thyroid Stimulating Hormone    Thyroxine, Free    Lipid Panel    Lipid screening     Z13.220    Relevant Orders    Comprehensive Metabolic Panel    CBC and Auto Differential    Thyroid Stimulating Hormone    Thyroxine, Free    Lipid Panel    Fatigue, unspecified type     R53.83    Relevant Orders    Comprehensive Metabolic Panel    CBC and Auto Differential    Thyroid Stimulating Hormone    Thyroxine, Free    Lipid Panel    Raynaud's phenomenon without gangrene     I73.00    Relevant Orders    Vitamin B12    Chronic left shoulder pain     M25.512, G89.29    Relevant Orders    XR shoulder left 2+ views

## 2024-11-15 DIAGNOSIS — R00.2 PALPITATIONS: ICD-10-CM

## 2024-11-15 RX ORDER — METOPROLOL SUCCINATE 25 MG/1
TABLET, EXTENDED RELEASE ORAL
Qty: 90 TABLET | Refills: 3 | Status: SHIPPED | OUTPATIENT
Start: 2024-11-15

## 2025-01-12 DIAGNOSIS — E03.9 HYPOTHYROIDISM, UNSPECIFIED: ICD-10-CM

## 2025-01-13 RX ORDER — LEVOTHYROXINE SODIUM 50 UG/1
50 TABLET ORAL DAILY
Qty: 90 TABLET | Refills: 3 | Status: SHIPPED | OUTPATIENT
Start: 2025-01-13

## 2025-04-10 LAB
ALBUMIN SERPL-MCNC: 4.6 G/DL (ref 3.6–5.1)
ALP SERPL-CCNC: 47 U/L (ref 31–125)
ALT SERPL-CCNC: 14 U/L (ref 6–29)
ANION GAP SERPL CALCULATED.4IONS-SCNC: 9 MMOL/L (CALC) (ref 7–17)
AST SERPL-CCNC: 17 U/L (ref 10–35)
BASOPHILS # BLD AUTO: 32 CELLS/UL (ref 0–200)
BASOPHILS NFR BLD AUTO: 0.6 %
BILIRUB SERPL-MCNC: 1.2 MG/DL (ref 0.2–1.2)
BUN SERPL-MCNC: 7 MG/DL (ref 7–25)
CALCIUM SERPL-MCNC: 9.2 MG/DL (ref 8.6–10.2)
CHLORIDE SERPL-SCNC: 103 MMOL/L (ref 98–110)
CHOLEST SERPL-MCNC: 148 MG/DL
CHOLEST/HDLC SERPL: 1.9 (CALC)
CO2 SERPL-SCNC: 25 MMOL/L (ref 20–32)
CREAT SERPL-MCNC: 0.55 MG/DL (ref 0.5–0.99)
EGFRCR SERPLBLD CKD-EPI 2021: 115 ML/MIN/1.73M2
EOSINOPHIL # BLD AUTO: 70 CELLS/UL (ref 15–500)
EOSINOPHIL NFR BLD AUTO: 1.3 %
ERYTHROCYTE [DISTWIDTH] IN BLOOD BY AUTOMATED COUNT: 12.8 % (ref 11–15)
GLUCOSE SERPL-MCNC: 80 MG/DL (ref 65–99)
HCT VFR BLD AUTO: 37.2 % (ref 35–45)
HDLC SERPL-MCNC: 78 MG/DL
HGB BLD-MCNC: 12.3 G/DL (ref 11.7–15.5)
LDLC SERPL CALC-MCNC: 57 MG/DL (CALC)
LYMPHOCYTES # BLD AUTO: 1172 CELLS/UL (ref 850–3900)
LYMPHOCYTES NFR BLD AUTO: 21.7 %
MCH RBC QN AUTO: 30.4 PG (ref 27–33)
MCHC RBC AUTO-ENTMCNC: 33.1 G/DL (ref 32–36)
MCV RBC AUTO: 92.1 FL (ref 80–100)
MONOCYTES # BLD AUTO: 508 CELLS/UL (ref 200–950)
MONOCYTES NFR BLD AUTO: 9.4 %
NEUTROPHILS # BLD AUTO: 3618 CELLS/UL (ref 1500–7800)
NEUTROPHILS NFR BLD AUTO: 67 %
NONHDLC SERPL-MCNC: 70 MG/DL (CALC)
PLATELET # BLD AUTO: 263 THOUSAND/UL (ref 140–400)
PMV BLD REES-ECKER: 12.8 FL (ref 7.5–12.5)
POTASSIUM SERPL-SCNC: 4.1 MMOL/L (ref 3.5–5.3)
PROT SERPL-MCNC: 6.8 G/DL (ref 6.1–8.1)
RBC # BLD AUTO: 4.04 MILLION/UL (ref 3.8–5.1)
SODIUM SERPL-SCNC: 137 MMOL/L (ref 135–146)
T4 FREE SERPL-MCNC: 1.4 NG/DL (ref 0.8–1.8)
TRIGL SERPL-MCNC: 44 MG/DL
TSH SERPL-ACNC: 3.17 MIU/L
VIT B12 SERPL-MCNC: 460 PG/ML (ref 200–1100)
WBC # BLD AUTO: 5.4 THOUSAND/UL (ref 3.8–10.8)